# Patient Record
Sex: MALE | URBAN - METROPOLITAN AREA
[De-identification: names, ages, dates, MRNs, and addresses within clinical notes are randomized per-mention and may not be internally consistent; named-entity substitution may affect disease eponyms.]

---

## 2024-05-15 ENCOUNTER — HOSPITAL ENCOUNTER (EMERGENCY)
Facility: MEDICAL CENTER | Age: 29
End: 2024-05-15
Attending: EMERGENCY MEDICINE | Admitting: HOSPITALIST
Payer: OTHER GOVERNMENT

## 2024-05-15 ENCOUNTER — APPOINTMENT (OUTPATIENT)
Dept: CARDIOLOGY | Facility: MEDICAL CENTER | Age: 29
End: 2024-05-15
Attending: EMERGENCY MEDICINE
Payer: OTHER GOVERNMENT

## 2024-05-15 VITALS
RESPIRATION RATE: 20 BRPM | BODY MASS INDEX: 20.73 KG/M2 | SYSTOLIC BLOOD PRESSURE: 133 MMHG | HEIGHT: 69 IN | OXYGEN SATURATION: 98 % | DIASTOLIC BLOOD PRESSURE: 75 MMHG | TEMPERATURE: 98 F | HEART RATE: 68 BPM | WEIGHT: 140 LBS

## 2024-05-15 DIAGNOSIS — R07.9 CHEST PAIN, UNSPECIFIED TYPE: ICD-10-CM

## 2024-05-15 DIAGNOSIS — I15.9 SECONDARY HYPERTENSION: ICD-10-CM

## 2024-05-15 DIAGNOSIS — R79.89 ELEVATED TROPONIN: ICD-10-CM

## 2024-05-15 PROBLEM — I30.9 PERICARDITIS, ACUTE: Status: ACTIVE | Noted: 2024-05-15

## 2024-05-15 LAB
ALBUMIN SERPL BCP-MCNC: 4.3 G/DL (ref 3.2–4.9)
ALBUMIN/GLOB SERPL: 1.7 G/DL
ALP SERPL-CCNC: 82 U/L (ref 30–99)
ALT SERPL-CCNC: 54 U/L (ref 2–50)
ANION GAP SERPL CALC-SCNC: 14 MMOL/L (ref 7–16)
AST SERPL-CCNC: 41 U/L (ref 12–45)
BASOPHILS # BLD AUTO: 1.6 % (ref 0–1.8)
BASOPHILS # BLD: 0.07 K/UL (ref 0–0.12)
BILIRUB SERPL-MCNC: 0.6 MG/DL (ref 0.1–1.5)
BUN SERPL-MCNC: 9 MG/DL (ref 8–22)
CALCIUM ALBUM COR SERPL-MCNC: 8.8 MG/DL (ref 8.5–10.5)
CALCIUM SERPL-MCNC: 9 MG/DL (ref 8.5–10.5)
CHLORIDE SERPL-SCNC: 107 MMOL/L (ref 96–112)
CO2 SERPL-SCNC: 22 MMOL/L (ref 20–33)
CREAT SERPL-MCNC: 0.99 MG/DL (ref 0.5–1.4)
CRP SERPL HS-MCNC: <0.3 MG/DL (ref 0–0.75)
EKG IMPRESSION: NORMAL
EOSINOPHIL # BLD AUTO: 0.06 K/UL (ref 0–0.51)
EOSINOPHIL NFR BLD: 1.4 % (ref 0–6.9)
ERYTHROCYTE [DISTWIDTH] IN BLOOD BY AUTOMATED COUNT: 44.7 FL (ref 35.9–50)
ERYTHROCYTE [SEDIMENTATION RATE] IN BLOOD BY WESTERGREN METHOD: 2 MM/HOUR (ref 0–20)
GFR SERPLBLD CREATININE-BSD FMLA CKD-EPI: 105 ML/MIN/1.73 M 2
GLOBULIN SER CALC-MCNC: 2.5 G/DL (ref 1.9–3.5)
GLUCOSE SERPL-MCNC: 84 MG/DL (ref 65–99)
HCT VFR BLD AUTO: 47.5 % (ref 42–52)
HGB BLD-MCNC: 16.7 G/DL (ref 14–18)
IMM GRANULOCYTES # BLD AUTO: 0.01 K/UL (ref 0–0.11)
IMM GRANULOCYTES NFR BLD AUTO: 0.2 % (ref 0–0.9)
LV EJECT FRACT  99904: 60
LYMPHOCYTES # BLD AUTO: 1.9 K/UL (ref 1–4.8)
LYMPHOCYTES NFR BLD: 43.7 % (ref 22–41)
MCH RBC QN AUTO: 36.6 PG (ref 27–33)
MCHC RBC AUTO-ENTMCNC: 35.2 G/DL (ref 32.3–36.5)
MCV RBC AUTO: 104.2 FL (ref 81.4–97.8)
MONOCYTES # BLD AUTO: 0.47 K/UL (ref 0–0.85)
MONOCYTES NFR BLD AUTO: 10.8 % (ref 0–13.4)
NEUTROPHILS # BLD AUTO: 1.84 K/UL (ref 1.82–7.42)
NEUTROPHILS NFR BLD: 42.3 % (ref 44–72)
NRBC # BLD AUTO: 0 K/UL
NRBC BLD-RTO: 0 /100 WBC (ref 0–0.2)
NT-PROBNP SERPL IA-MCNC: <36 PG/ML (ref 0–125)
PLATELET # BLD AUTO: 280 K/UL (ref 164–446)
PMV BLD AUTO: 9.9 FL (ref 9–12.9)
POTASSIUM SERPL-SCNC: 4.4 MMOL/L (ref 3.6–5.5)
PROT SERPL-MCNC: 6.8 G/DL (ref 6–8.2)
RBC # BLD AUTO: 4.56 M/UL (ref 4.7–6.1)
SODIUM SERPL-SCNC: 143 MMOL/L (ref 135–145)
TROPONIN T SERPL-MCNC: 56 NG/L (ref 6–19)
WBC # BLD AUTO: 4.4 K/UL (ref 4.8–10.8)

## 2024-05-15 PROCEDURE — 93306 TTE W/DOPPLER COMPLETE: CPT | Mod: 26 | Performed by: INTERNAL MEDICINE

## 2024-05-15 PROCEDURE — 99222 1ST HOSP IP/OBS MODERATE 55: CPT | Performed by: INTERNAL MEDICINE

## 2024-05-15 RX ORDER — IBUPROFEN 600 MG/1
600 TABLET ORAL EVERY 6 HOURS PRN
COMMUNITY

## 2024-05-15 RX ORDER — LISINOPRIL 10 MG/1
10 TABLET ORAL DAILY
Qty: 30 TABLET | Refills: 0 | Status: SHIPPED | OUTPATIENT
Start: 2024-05-15

## 2024-05-15 RX ORDER — HYDROCHLOROTHIAZIDE 12.5 MG/1
12.5 TABLET ORAL DAILY
COMMUNITY

## 2024-05-15 RX ORDER — HYDRALAZINE HYDROCHLORIDE 20 MG/ML
10 INJECTION INTRAMUSCULAR; INTRAVENOUS ONCE
Status: COMPLETED | OUTPATIENT
Start: 2024-05-15 | End: 2024-05-15

## 2024-05-15 RX ADMIN — HYDRALAZINE HYDROCHLORIDE 10 MG: 20 INJECTION INTRAMUSCULAR; INTRAVENOUS at 16:23

## 2024-05-15 NOTE — DISCHARGE INSTRUCTIONS
You are leaving AGAINST MEDICAL ADVICE.  Please note that by leaving you may have a significant abnormal cardiac event resulting in heart attack, permanent damage, death.  Please return for evaluation as soon as possible.  Please fill the prescription for blood pressure medication as prescribed.  [place] or return to this hospital for further evaluation and management.  We will always be happy to see you.  I have referred you to cardiology for further evaluation.    You are having possible heart abnormality, heart attack.  Your troponin is elevated as well as a marker for heart disease.  Please return for further evaluation.

## 2024-05-15 NOTE — ED PROVIDER NOTES
ED Provider Note    CHIEF COMPLAINT  Chief Complaint   Patient presents with    Chest Pain       EXTERNAL RECORDS REVIEWED  Reviewed the note from [facility] reveals evidence of second troponin is 72, CBC shows no evidence of leukocytosis, BMP shows no evidence of abnormality, CRP    HPI/ROS  LIMITATION TO HISTORY     Jayant Beard is a 29 y.o. male who presents with complaint of chest tightness.  The patient started having chest this morning when he arose.  Pain is dull in nature, comes and goes, no radiation to his neck, to his back, to his arm.  Does have history of pericarditis 1 year prior when he was in [place] and [facility].  In addition, he states that he was diagnosed with high blood pressure and start HCTZ at that point.  Does not take colchicine.  Patient has fever, shakes, chills, sweats, IV drug use, lightness, dizziness, nausea, vomiting.  He was seen at [facility] today and was found to have EKG findings with diffuse ST elevation and elevated troponin that went from 67-72.  The patient was placed on heparin infusion and received aspirin and sent to our facility for cardiology consultation.  Currently the patient states that he does have slight chest pressure is very similar to his previous case of pericarditis.Was less than 3, BNP less than 36.  Please note the troponin was 69 at 8:16 AM and 72 at 9:21 AM.  Chest x-ray was completed revealed no evidence of pulmonary edema, cardiomegaly or abnormal finding.  EKG showed diffuse ST elevation consistent with pericarditis.    PAST MEDICAL HISTORY   has a past medical history of Hypertension.    SURGICAL HISTORY  patient denies any surgical history    FAMILY HISTORY  History reviewed. No pertinent family history.    SOCIAL HISTORY  Social History     Tobacco Use    Smoking status: Never    Smokeless tobacco: Not on file   Vaping Use    Vaping status: Every Day   Substance and Sexual Activity    Alcohol use: Yes      "Comment: OCC    Drug use: Never    Sexual activity: Not on file       CURRENT MEDICATIONS  Home Medications       Reviewed by Miguel Queen (Pharmacy Tech) on 05/15/24 at 1444  Med List Status: Complete     Medication Last Dose Status   hydroCHLOROthiazide 12.5 MG tablet 5/14/2024 Active   ibuprofen (MOTRIN) 600 MG Tab 1 week Active                  Audit from Redirected Encounters    **Home medications have not yet been reviewed for this encounter**         ALLERGIES  No Known Allergies    PHYSICAL EXAM  VITAL SIGNS: BP (!) 170/105   Pulse 66   Temp 37.3 °C (99.1 °F) (Temporal)   Resp 14   Ht 1.753 m (5' 9\")   Wt 63.5 kg (140 lb)   SpO2 96%   BMI 20.67 kg/m²      Nursing notes and vitals reviewed.  Constitutional: Well developed, Well nourished, No acute distress, Non-toxic appearance.   Eyes: PERRLA, EOMI, Conjunctiva normal, No discharge.   HENT: Normocephalic, Atraumatic, moist mucous membranes, no facial edema   Cardiovascular: Normal heart rate, Normal rhythm, No murmurs, No rubs, No gallops.   Thorax & Lungs: No respiratory distress, No rales, No rhonchi, No wheezing, No chest tenderness.   Skin: Warm, Dry, No erythema, No rash.   Extremities: No deformity, no edema, good range of motion range of motion upper lower extremes bilaterally  Neurologic: Alert & oriented x 3, no focal abnormalities noted, acting appropriately on examination  Psychiatric: Affect normal for clinical presentation.      EKG/LABS  Normal sinus rhythm on monitor  I have independently interpreted this EKG    RADIOLOGY/PROCEDURES   Reviewed the findings of the chest x-ray outlying facility was negative for acute abnormality such as pulmonary edema or cardiomegaly      COURSE & MEDICAL DECISION MAKING    ASSESSMENT, COURSE AND PLAN  Care Narrative: This is a 2-year-old gentleman presents with chest pressure.  Initial troponin from [facility] was 69 and up to 72.  He has diffuse ST elevation.  The patient has no pain here in " the emergency department.  I discussed the patient Dr. Hernandez with cardiology who evaluated the patient, states the patient will like to go home and he is okay with the patient going home but did get stat echocardiogram and a repeat troponin.  The patient's troponin did bounce up into the high 100 range to give him a call and that would probably force the hand for the patient to stay.  The patient did received aspirin and placed on heparin prior to arrival.  The emergency department, the patient has a troponin of 52, his pain-free, EKG is the same with diffuse ST elevation but no evidence of ST elevation myocardial infarction.  Patient states he has significant things he needs to do back home and will come back to the hospital does not want to stay.  Talked him at length concerning risk of cardiac arrest, congestive heart failure, increased mortality morbidity.  He does understand but still states he would like to go.  He does not want a wait for second troponin as well.  Initially discussed the patient with the hospitalist but the patient decided to go AMA prior to hospitalist seeing him.  For this reason, I have the patient signed the AMA form, he understands that he may be having a myocardial infarction that we will need further evaluation.  Initial blood pressure, I did write lisinopril prescription and add on his hydrochlorothiazide needs to follow-up with his primary care physician and the base physician for the [employer].  He received hydralazine here and states he did not want to wait any longer for results or testing.      The patient is leaving against medical advice.  I discussed with the patient the risks of leaving without receiving appropriate care to include permanent disability or death.  I have discussed possible alternatives.  The patient is not intoxicated.  The patient is a capable decision maker and understands the risks and benefits of their decision.  While I certainly disagree with the  patient's decision, I respect the patient's autonomy and will not keep them here against their will.  They understand that their decision to leave can be reversed at any time and they can return to us at any time for any reason at all.             ADDITIONAL PROBLEMS MANAGED  Hypertension, added on lisinopril the patient's medication regimen per recommendation of cardiology.    DISPOSITION AND DISCUSSIONS  I have discussed management of the patient with the following physicians and SD's: Dr. Fish who evaluated the patient and stated the patient would like to go home.  He recommends a stat echocardiogram as well as repeat troponin in 4 hours.  If the troponin is bouncing up in the high 100 range to have the patient hospitalized.  Patient will be going AMA.        Escalation of care considered, and ultimately not performed:after discussion with the patient / family, they have elected to decline an escalation in care        FINAL DIAGNOSIS  1. Chest pain, unspecified type    2. Elevated troponin    3. Secondary hypertension          DISPOSITION:  Patient will be discharged home in stable condition.    FOLLOW UP:  [facility], Emergency Dept  [address] [address]  [phone]        [facility] - Operated by [facility]  [address], [address]  [facility] [id number] [phone]  Schedule an appointment as soon as possible for a visit         OUTPATIENT MEDICATIONS:  New Prescriptions    LISINOPRIL (PRINIVIL) 10 MG TAB    Take 1 Tablet by mouth every day.            Electronically signed by: Casey Benjamin D.O., 5/15/2024 2:28 PM

## 2024-05-15 NOTE — ED NOTES
All discharge instructions given to pt.    Pt verbalized understanding of all discharge instructions. All lines removed prior to discharge. All questions answered. All personal belongings sent with pt. Pt ambulatory to [facility] paperwork completed.

## 2024-05-15 NOTE — ED NOTES
Medication history reviewed with patient at bedside.   Med rec is complete  Allergies reviewed.     Patient has not had any outpatient antibiotics in the last 30 days.     Anticoagulants: No    Miguel Queen

## 2024-05-15 NOTE — ED TRIAGE NOTES
"Chief Complaint   Patient presents with    Chest Pain     BIB EMS from [facility]. Pt reports CP this morning. Recent diagnosis pericarditis. Troponin trending up from 67-72. Started on heparin drip after 3840 bolus. Received 324mg ASA as well. Pt placed on monitor. Denies pain at this time.  BP (!) 150/99   Pulse 68   Temp 37.3 °C (99.1 °F) (Temporal)   Resp 16   Ht 1.753 m (5' 9\")   Wt 63.5 kg (140 lb)   SpO2 97%     "

## 2024-05-16 NOTE — CONSULTS
Reason for Consult:  Asked by Dr Benjamin providers found to see this patient with chest pain  Patient's PCP: No primary care provider on file.    CC:   Chief Complaint   Patient presents with    Chest Pain       HPI: 29-year-old male patient presents with left-sided chest tightness.  Not associated with activity.,  Not worse with deep respiration, it is resolved at this time.  No family history of coronary artery disease except hypertension.  His blood pressure has been running high.    Medications / Drug list prior to admission:  No current facility-administered medications on file prior to encounter.     Current Outpatient Medications on File Prior to Encounter   Medication Sig Dispense Refill    hydroCHLOROthiazide 12.5 MG tablet Take 12.5 mg by mouth every day.      ibuprofen (MOTRIN) 600 MG Tab Take 600 mg by mouth every 6 hours as needed for Mild Pain.         Current list of administered Medications:  No current facility-administered medications for this encounter.    Current Outpatient Medications:     hydroCHLOROthiazide 12.5 MG tablet, Take 12.5 mg by mouth every day., Disp: , Rfl:     ibuprofen (MOTRIN) 600 MG Tab, Take 600 mg by mouth every 6 hours as needed for Mild Pain., Disp: , Rfl:     lisinopril (PRINIVIL) 10 MG Tab, Take 1 Tablet by mouth every day., Disp: 30 Tablet, Rfl: 0    Past Medical History:   Diagnosis Date    Hypertension        History reviewed. No pertinent surgical history.    History reviewed. No pertinent family history.  Patient family history was personally reviewed, no pertinent family history to current presentation    Social History     Tobacco Use    Smoking status: Never   Vaping Use    Vaping status: Every Day   Substance Use Topics    Alcohol use: Yes     Comment: OCC    Drug use: Never       ALLERGIES:  No Known Allergies    Review of systems:  A complete review of symptoms was reviewed with patient. This is reviewed in H&P and PMH. ALL OTHERS reviewed and  "negative    Physical exam:  Patient Vitals for the past 24 hrs:   BP Temp Temp src Pulse Resp SpO2 Height Weight   05/15/24 1633 133/75 36.7 °C (98 °F) Temporal 68 20 98 % -- --   05/15/24 1603 (!) 170/105 -- -- 66 14 96 % -- --   05/15/24 1533 (!) 157/89 -- -- (!) 59 15 97 % -- --   05/15/24 1503 (!) 160/96 -- -- 70 -- 95 % -- --   05/15/24 1502 -- -- -- -- 12 -- -- --   05/15/24 1433 (!) 154/101 -- -- 67 19 97 % -- --   05/15/24 1423 (!) 150/99 37.3 °C (99.1 °F) Temporal 68 16 97 % 1.753 m (5' 9\") 63.5 kg (140 lb)     General: No acute distress.   EYES: no jaundice  HEENT: OP clear   Neck:  No JVD.   CVS:  RRR. S1 + S2. No M/R/G  Resp: Normal respiratory effort, CTAB. No wheezing or crackles/rhonchi.  Abdomen: Soft, ND,  Skin: Grossly nothing acute no obvious rashes  Neurological: Alert, Moves all extremities  Extremities:   no edema. No cyanosis.       Data:  Laboratory studies personally reviewed by me:  Recent Results (from the past 24 hour(s))   CBC WITH DIFFERENTIAL    Collection Time: 05/15/24  2:37 PM   Result Value Ref Range    WBC 4.4 (L) 4.8 - 10.8 K/uL    RBC 4.56 (L) 4.70 - 6.10 M/uL    Hemoglobin 16.7 14.0 - 18.0 g/dL    Hematocrit 47.5 42.0 - 52.0 %    .2 (H) 81.4 - 97.8 fL    MCH 36.6 (H) 27.0 - 33.0 pg    MCHC 35.2 32.3 - 36.5 g/dL    RDW 44.7 35.9 - 50.0 fL    Platelet Count 280 164 - 446 K/uL    MPV 9.9 9.0 - 12.9 fL    Neutrophils-Polys 42.30 (L) 44.00 - 72.00 %    Lymphocytes 43.70 (H) 22.00 - 41.00 %    Monocytes 10.80 0.00 - 13.40 %    Eosinophils 1.40 0.00 - 6.90 %    Basophils 1.60 0.00 - 1.80 %    Immature Granulocytes 0.20 0.00 - 0.90 %    Nucleated RBC 0.00 0.00 - 0.20 /100 WBC    Neutrophils (Absolute) 1.84 1.82 - 7.42 K/uL    Lymphs (Absolute) 1.90 1.00 - 4.80 K/uL    Monos (Absolute) 0.47 0.00 - 0.85 K/uL    Eos (Absolute) 0.06 0.00 - 0.51 K/uL    Baso (Absolute) 0.07 0.00 - 0.12 K/uL    Immature Granulocytes (abs) 0.01 0.00 - 0.11 K/uL    NRBC (Absolute) 0.00 K/uL   COMP " METABOLIC PANEL    Collection Time: 05/15/24  2:37 PM   Result Value Ref Range    Sodium 143 135 - 145 mmol/L    Potassium 4.4 3.6 - 5.5 mmol/L    Chloride 107 96 - 112 mmol/L    Co2 22 20 - 33 mmol/L    Anion Gap 14.0 7.0 - 16.0    Glucose 84 65 - 99 mg/dL    Bun 9 8 - 22 mg/dL    Creatinine 0.99 0.50 - 1.40 mg/dL    Calcium 9.0 8.5 - 10.5 mg/dL    Correct Calcium 8.8 8.5 - 10.5 mg/dL    AST(SGOT) 41 12 - 45 U/L    ALT(SGPT) 54 (H) 2 - 50 U/L    Alkaline Phosphatase 82 30 - 99 U/L    Total Bilirubin 0.6 0.1 - 1.5 mg/dL    Albumin 4.3 3.2 - 4.9 g/dL    Total Protein 6.8 6.0 - 8.2 g/dL    Globulin 2.5 1.9 - 3.5 g/dL    A-G Ratio 1.7 g/dL   TROPONIN    Collection Time: 05/15/24  2:37 PM   Result Value Ref Range    Troponin T 56 (H) 6 - 19 ng/L   proBrain Natriuretic Peptide, NT    Collection Time: 05/15/24  2:37 PM   Result Value Ref Range    NT-proBNP <36 0 - 125 pg/mL   CRP QUANTITIVE (NON-CARDIAC)    Collection Time: 05/15/24  2:37 PM   Result Value Ref Range    Stat C-Reactive Protein <0.30 0.00 - 0.75 mg/dL   ESTIMATED GFR    Collection Time: 05/15/24  2:37 PM   Result Value Ref Range    GFR (CKD-EPI) 105 >60 mL/min/1.73 m 2   EC-ECHOCARDIOGRAM COMPLETE W/O CONT    Collection Time: 05/15/24  3:52 PM   Result Value Ref Range    Left Ventrical Ejection Fraction 60        Imaging:  EC-ECHOCARDIOGRAM COMPLETE W/O CONT   Final Result        EKG tracings personally reviewed by me sinus rhythm, diffuse ST elevations likely normal repolarization abnormality    All pertinent features of laboratory and imaging reviewed including primary images where applicable      Principal Problem:    Pericarditis, acute (POA: Yes)  Resolved Problems:    * No resolved hospital problems. *      Assessment / Plan:  29-year-old male patient with chest tightness, mild troponin elevation, hypertension.  Recommend admission to hospital, serial troponin, echocardiogram, possible a stress test.    However patient is less interested in workup,  discussed with ER physician Dr. Schmid .          No future appointments.    It is my pleasure to participate in the care of Mr. Beard.  Please do not hesitate to contact me with questions or concerns.    Dank Vela M.D.    5/15/2024

## 2024-07-07 ENCOUNTER — DOCUMENTATION (OUTPATIENT)
Dept: HOSPITALIST | Facility: MEDICAL CENTER | Age: 29
End: 2024-07-07
Payer: OTHER GOVERNMENT

## 2024-07-07 DIAGNOSIS — K63.89 MESENTERIC MASS: ICD-10-CM

## 2024-07-08 ENCOUNTER — HOSPITAL ENCOUNTER (OUTPATIENT)
Dept: RADIOLOGY | Facility: MEDICAL CENTER | Age: 29
End: 2024-07-08
Attending: PHYSICIAN ASSISTANT
Payer: OTHER GOVERNMENT

## 2024-07-09 ENCOUNTER — HOSPITAL ENCOUNTER (OUTPATIENT)
Dept: RADIOLOGY | Facility: MEDICAL CENTER | Age: 29
End: 2024-07-09
Payer: OTHER GOVERNMENT

## 2024-07-16 ENCOUNTER — HOSPITAL ENCOUNTER (OUTPATIENT)
Dept: HEMATOLOGY ONCOLOGY | Facility: MEDICAL CENTER | Age: 29
End: 2024-07-16
Attending: NURSE PRACTITIONER
Payer: OTHER GOVERNMENT

## 2024-07-16 VITALS
OXYGEN SATURATION: 98 % | DIASTOLIC BLOOD PRESSURE: 98 MMHG | BODY MASS INDEX: 21.01 KG/M2 | TEMPERATURE: 98.1 F | HEART RATE: 77 BPM | HEIGHT: 69 IN | SYSTOLIC BLOOD PRESSURE: 142 MMHG | RESPIRATION RATE: 15 BRPM | WEIGHT: 141.87 LBS

## 2024-07-16 DIAGNOSIS — K63.89 MESENTERIC MASS: ICD-10-CM

## 2024-07-16 PROCEDURE — 99204 OFFICE O/P NEW MOD 45 MIN: CPT | Performed by: NURSE PRACTITIONER

## 2024-07-16 PROCEDURE — 99212 OFFICE O/P EST SF 10 MIN: CPT | Performed by: NURSE PRACTITIONER

## 2024-07-16 RX ORDER — ONDANSETRON 4 MG/1
4 TABLET, ORALLY DISINTEGRATING ORAL EVERY 8 HOURS PRN
COMMUNITY
Start: 2024-04-22

## 2024-07-16 RX ORDER — DICYCLOMINE HCL 20 MG
20 TABLET ORAL EVERY 6 HOURS
COMMUNITY
Start: 2024-07-08

## 2024-07-16 RX ORDER — HYDROCODONE BITARTRATE AND ACETAMINOPHEN 5; 325 MG/1; MG/1
1 TABLET ORAL EVERY 4 HOURS PRN
COMMUNITY
Start: 2024-07-08

## 2024-07-16 ASSESSMENT — ENCOUNTER SYMPTOMS
NAUSEA: 0
DIZZINESS: 0
FEVER: 0
CONSTIPATION: 0
MYALGIAS: 0
DIARRHEA: 0
VOMITING: 0
DIAPHORESIS: 1
HEADACHES: 0
CHILLS: 0
SHORTNESS OF BREATH: 0
COUGH: 1
WEIGHT LOSS: 0
ABDOMINAL PAIN: 1
BLOOD IN STOOL: 0

## 2024-07-16 ASSESSMENT — FIBROSIS 4 INDEX: FIB4 SCORE: 0.6

## 2024-07-16 ASSESSMENT — PAIN SCALES - GENERAL: PAINLEVEL: 5=MODERATE PAIN

## 2024-07-29 NOTE — PROGRESS NOTES
Subjective:   8/7/2024 10:25 AM  Primary care physician: No primary care provider on file.  Referring Provider: ELDA Dorado.  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 Mesenteric mass        2. Abnormal CT of the abdomen            History of presenting illness:    Jayant Beard is a pleasant 29 y.o. male with PMH of HTN and pericarditis who presented to the ED in [facility] on 7/7/24 via ambulance for 1-day hx of abdominal pain. At the time he had nausea and retching but no vomiting. He denied history of abdominal surgery. CT of the abdomen and pelvis on 7/7/24 noted a large solid polypoid mass measuring 9.6 x 5.2 x 5.0 cm in the abdominal mesentery abutting and likely attached to the greater curvature of the stomach consistent with a neoplasm such as gastrointestinal stromal tumor along with a moderate amount of hypoattenuation ascites present. He was eventually discharged, referred to the [facility] hem/onc clinic, and subsequently referred to our clinic for surgical evaluation.    He is here today for surgical evaluation.  He noted an episode of severe abdominal pain while he was playing pool one night.  Had been drinking a little bit socially during that episode but otherwise nothing out of his usual routine.  This didn't resolve so he presented to the hospital where a gastric tumor was identified.  He in general has intermittent pain in his abdomen.  No episode like he originally had.  Tolerating a diet and having bowel movements.  No personal or family history of cancer.  Denies smoking.  Only other medical problem was an episode of pericardititis in past that resolved, unclear etiology.    Past Medical History:   Diagnosis Date    Hypertension     Pericarditis      No  past surgical history on file.  No Known Allergies  Outpatient Encounter Medications as of 8/7/2024   Medication Sig Dispense Refill    ondansetron (ZOFRAN ODT) 4 MG TABLET DISPERSIBLE Take 4 mg by mouth every 8 hours as needed for Nausea/Vomiting.      Naloxone (NARCAN) 4 MG/0.1ML Liquid       dicyclomine (BENTYL) 20 MG Tab Take 20 mg by mouth every 6 hours.      HYDROcodone-acetaminophen (NORCO) 5-325 MG Tab per tablet Take 1 Tablet by mouth every four hours as needed.      hydroCHLOROthiazide 12.5 MG tablet Take 12.5 mg by mouth every day.      ibuprofen (MOTRIN) 600 MG Tab Take 600 mg by mouth every 6 hours as needed for Mild Pain.      lisinopril (PRINIVIL) 10 MG Tab Take 1 Tablet by mouth every day. 30 Tablet 0     No facility-administered encounter medications on file as of 8/7/2024.     Social History     Socioeconomic History    Marital status: Single     Spouse name: Not on file    Number of children: Not on file    Years of education: Not on file    Highest education level: Not on file   Occupational History    Not on file   Tobacco Use    Smoking status: Never    Smokeless tobacco: Never   Vaping Use    Vaping status: Every Day    Substances: Nicotine   Substance and Sexual Activity    Alcohol use: Yes     Comment: OCC    Drug use: Never    Sexual activity: Not on file   Other Topics Concern    Not on file   Social History Narrative    In the [place] in [place]     Social Determinants of Health     Financial Resource Strain: Not on file   Food Insecurity: Not on file   Transportation Needs: Not on file   Physical Activity: Not on file   Stress: Not on file   Social Connections: Not on file   Intimate Partner Violence: Not on file   Housing Stability: Not on file      Social History     Tobacco Use   Smoking Status Never   Smokeless Tobacco Never     Social History     Substance and Sexual Activity   Alcohol Use Yes    Comment: OCC     Social History     Substance and Sexual Activity   Drug Use Never     "  Family History   Problem Relation Age of Onset    Cancer Neg Hx        Review of Systems   Constitutional:  Negative for chills, fever, malaise/fatigue and weight loss.   HENT:  Negative for congestion, ear discharge, ear pain, hearing loss and nosebleeds.    Eyes:  Negative for blurred vision, double vision, photophobia, pain and discharge.   Respiratory:  Negative for cough, hemoptysis and sputum production.    Cardiovascular:  Negative for chest pain, palpitations, orthopnea and claudication.   Gastrointestinal:  Positive for abdominal pain. Negative for constipation, diarrhea, heartburn, nausea and vomiting.   Genitourinary:  Negative for dysuria, frequency, hematuria and urgency.   Musculoskeletal:  Negative for back pain, joint pain, myalgias and neck pain.   Skin:  Negative for itching and rash.   Neurological:  Negative for dizziness, tingling, tremors, sensory change, speech change, focal weakness and headaches.   Endo/Heme/Allergies:  Negative for environmental allergies. Does not bruise/bleed easily.   Psychiatric/Behavioral:  Negative for depression, hallucinations, substance abuse and suicidal ideas. The patient is not nervous/anxious.         Objective:   BP (!) 140/100 (BP Location: Right arm, Patient Position: Sitting, BP Cuff Size: Adult)   Pulse 80   Temp 37.2 °C (99 °F) (Temporal)   Ht 1.753 m (5' 9\")   Wt 67.1 kg (148 lb)   SpO2 93%   BMI 21.86 kg/m²     Physical Exam  Constitutional:       General: He is not in acute distress.     Appearance: He is not ill-appearing.   HENT:      Head: Normocephalic.   Eyes:      General: No scleral icterus.     Pupils: Pupils are equal, round, and reactive to light.   Cardiovascular:      Rate and Rhythm: Normal rate and regular rhythm.   Pulmonary:      Effort: Pulmonary effort is normal. No respiratory distress.   Abdominal:      General: Abdomen is flat. There is no distension.      Palpations: Abdomen is soft.      Tenderness: There is abdominal " tenderness.      Comments: Tender in epigastric region   Skin:     Coloration: Skin is not jaundiced.   Neurological:      General: No focal deficit present.      Mental Status: He is alert and oriented to person, place, and time.         Labs  (7/7/24)         Imaging  CT CHEST (7/8/24)      CT A/P (7/7/24)      Pathology  N/A    Procedures  N/A    Diagnosis:     1. Mesenteric mass        2. 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    He currently does not have a diagnosis.  We reviewed the possible etiology of the tumor including adenocarcinoma, GIST, neuroendocrine tumor along with other more benign types of masses.  We need to obtain a tissue diagnosis to better understand the best of course of treatment.  I will repeat his CT scan abdomen/pelvis with contrast and have referred him to GI for biopsy.  Once completed we will get him back in to discuss results and treatment plan.    I, Jeff Vargas MD have entered, reviewed and confirmed the above diagnosis related to this patient on this date of service, August 7, 2024     Jeff Vargas M.D.

## 2024-08-07 ENCOUNTER — OFFICE VISIT (OUTPATIENT)
Dept: SURGICAL ONCOLOGY | Facility: MEDICAL CENTER | Age: 29
End: 2024-08-07
Payer: OTHER GOVERNMENT

## 2024-08-07 VITALS
HEART RATE: 80 BPM | SYSTOLIC BLOOD PRESSURE: 140 MMHG | OXYGEN SATURATION: 93 % | DIASTOLIC BLOOD PRESSURE: 100 MMHG | TEMPERATURE: 99 F | WEIGHT: 148 LBS | HEIGHT: 69 IN | BODY MASS INDEX: 21.92 KG/M2

## 2024-08-07 DIAGNOSIS — R93.5 ABNORMAL CT OF THE ABDOMEN: ICD-10-CM

## 2024-08-07 DIAGNOSIS — K63.89 MESENTERIC MASS: ICD-10-CM

## 2024-08-07 PROCEDURE — 3080F DIAST BP >= 90 MM HG: CPT | Performed by: SURGERY

## 2024-08-07 PROCEDURE — 99204 OFFICE O/P NEW MOD 45 MIN: CPT | Performed by: SURGERY

## 2024-08-07 PROCEDURE — 3077F SYST BP >= 140 MM HG: CPT | Performed by: SURGERY

## 2024-08-07 ASSESSMENT — FIBROSIS 4 INDEX: FIB4 SCORE: 0.6

## 2024-08-09 ASSESSMENT — ENCOUNTER SYMPTOMS
NECK PAIN: 0
ORTHOPNEA: 0
HEMOPTYSIS: 0
SPUTUM PRODUCTION: 0
CONSTIPATION: 0
FOCAL WEAKNESS: 0
VOMITING: 0
HEADACHES: 0
WEIGHT LOSS: 0
COUGH: 0
ABDOMINAL PAIN: 1
NERVOUS/ANXIOUS: 0
DEPRESSION: 0
NAUSEA: 0
HALLUCINATIONS: 0
BLURRED VISION: 0
HEARTBURN: 0
SENSORY CHANGE: 0
BACK PAIN: 0
FEVER: 0
DIZZINESS: 0
DOUBLE VISION: 0
PALPITATIONS: 0
SPEECH CHANGE: 0
CHILLS: 0
BRUISES/BLEEDS EASILY: 0
CLAUDICATION: 0
DIARRHEA: 0
TREMORS: 0
PHOTOPHOBIA: 0
TINGLING: 0
EYE PAIN: 0
EYE DISCHARGE: 0
MYALGIAS: 0

## 2024-08-09 ASSESSMENT — LIFESTYLE VARIABLES: SUBSTANCE_ABUSE: 0

## 2024-11-04 ENCOUNTER — TELEPHONE (OUTPATIENT)
Dept: SURGICAL ONCOLOGY | Facility: MEDICAL CENTER | Age: 29
End: 2024-11-04
Payer: OTHER GOVERNMENT

## 2024-11-04 NOTE — TELEPHONE ENCOUNTER
Dr. Say Alvarenag from [facility]  reached out to see if we know if Jayant got the biopsy done. I didn't see anything in the chart. He wanted to know if you were able to give him a call on his cell at [phone] to talk about his care.

## 2024-11-11 ENCOUNTER — TELEPHONE (OUTPATIENT)
Dept: SURGICAL ONCOLOGY | Facility: MEDICAL CENTER | Age: 29
End: 2024-11-11
Payer: OTHER GOVERNMENT

## 2024-11-11 ENCOUNTER — HOSPITAL ENCOUNTER (OUTPATIENT)
Dept: RADIOLOGY | Facility: MEDICAL CENTER | Age: 29
End: 2024-11-11
Payer: OTHER GOVERNMENT

## 2024-11-11 DIAGNOSIS — K63.89 MESENTERIC MASS: ICD-10-CM

## 2024-11-11 DIAGNOSIS — R93.5 ABNORMAL CT OF THE ABDOMEN: ICD-10-CM

## 2024-11-11 NOTE — TELEPHONE ENCOUNTER
Called patient this AM to follow up on ordered CT scan and GI workup. Pt states he was able to get the CT scan done last week at [facility] as he recently had to present to the ED. However regarding the GI workup patient states he has been unsuccessful with scheduling with GI for follow up.    Requested CT images and report from [facility]. Dr. Vargas discussed with Dr. Swift, STAT referral placed for GI consultants. Called patient back to provide this information however patient did not respond. Voicemail left with callback number and will send patient Upptalk message with specific instructions.

## 2024-12-17 ENCOUNTER — TELEPHONE (OUTPATIENT)
Dept: SURGICAL ONCOLOGY | Facility: MEDICAL CENTER | Age: 29
End: 2024-12-17
Payer: OTHER GOVERNMENT

## 2024-12-17 NOTE — TELEPHONE ENCOUNTER
Malvin to schedule     ----- Message from Nurse Practitioner GOOD Lin sent at 12/17/2024 10:57 AM PST -----  Regarding: Follow up with Dr. Alicia chavez,    Not a rush but can we call to schedule this patient for follow up with Dr. Vargas when he returns from vacation?    Thanks very much

## 2024-12-30 ENCOUNTER — TELEPHONE (OUTPATIENT)
Dept: HEALTH INFORMATION MANAGEMENT | Facility: OTHER | Age: 29
End: 2024-12-30
Payer: OTHER GOVERNMENT

## 2025-05-09 ENCOUNTER — TELEPHONE (OUTPATIENT)
Facility: MEDICAL CENTER | Age: 30
End: 2025-05-09
Payer: OTHER GOVERNMENT

## 2025-05-09 NOTE — TELEPHONE ENCOUNTER
Future Appointments   Date Time Provider Department Center   6/3/2025 10:00 AM Jeff Vargas M.D. [facility] None     Scheduled appointment to discuss surgery.

## 2025-05-16 ENCOUNTER — APPOINTMENT (OUTPATIENT)
Dept: LAB | Facility: MEDICAL CENTER | Age: 30
End: 2025-05-16
Payer: OTHER GOVERNMENT

## 2025-06-03 ENCOUNTER — TELEPHONE (OUTPATIENT)
Facility: MEDICAL CENTER | Age: 30
End: 2025-06-03

## 2025-06-03 NOTE — TELEPHONE ENCOUNTER
Pt no show for appointment today with Dr. Vargas to discuss potential resection of gastric lesion found on recent imaging. Surgical oncology office has made numerous attempts to have patient RTC d/t concern regarding this lesion however patient has repeatedly cancelled/not shown up for these appointments.    Tried calling patient, no response. Voicemail left with office callback number. [employer] message sent.